# Patient Record
Sex: MALE | Race: WHITE
[De-identification: names, ages, dates, MRNs, and addresses within clinical notes are randomized per-mention and may not be internally consistent; named-entity substitution may affect disease eponyms.]

---

## 2017-01-18 ENCOUNTER — HOSPITAL ENCOUNTER (OUTPATIENT)
Dept: HOSPITAL 80 - FIMAGING | Age: 72
End: 2017-01-18
Attending: INTERNAL MEDICINE
Payer: COMMERCIAL

## 2017-01-18 DIAGNOSIS — I48.91: ICD-10-CM

## 2017-01-18 DIAGNOSIS — J40: Primary | ICD-10-CM

## 2017-01-18 DIAGNOSIS — R05: ICD-10-CM

## 2017-01-18 NOTE — DX
Chest, Two Views at 1731 hours



History: Atrial fibrillation, I 48.91. Cough.



Comparison: CT March 2014



Findings: Cardiac silhouette is within normal range. Lower thoracic levoscoliosis. Bilateral peribron
chial thickening. No pneumonia, congestive heart failure, pleural effusion, or pneumothorax.



Impression:  

1. Bronchitis.

2. No definite pneumonia.

3. No pneumothorax.

4. Consider CT chest imaging if clinically indicated.

## 2017-03-02 ENCOUNTER — HOSPITAL ENCOUNTER (OUTPATIENT)
Dept: HOSPITAL 80 - BHFA | Age: 72
End: 2017-03-02
Attending: INTERNAL MEDICINE
Payer: COMMERCIAL

## 2017-03-02 DIAGNOSIS — I48.0: Primary | ICD-10-CM

## 2017-03-02 DIAGNOSIS — I48.92: ICD-10-CM

## 2017-03-02 DIAGNOSIS — I47.1: ICD-10-CM

## 2017-04-03 ENCOUNTER — HOSPITAL ENCOUNTER (OUTPATIENT)
Dept: HOSPITAL 80 - FIMAGING | Age: 72
End: 2017-04-03
Attending: INTERNAL MEDICINE
Payer: COMMERCIAL

## 2017-04-03 DIAGNOSIS — I48.91: Primary | ICD-10-CM

## 2017-04-03 DIAGNOSIS — R91.8: ICD-10-CM

## 2017-04-03 LAB
CREAT SERPL-MCNC: 1.1 MG/DL (ref 0.7–1.3)
GFR SERPL CREATININE-BSD FRML MDRD: > 60 ML/MIN/{1.73_M2}

## 2017-04-03 PROCEDURE — 75572 CT HRT W/3D IMAGE: CPT

## 2017-04-04 ENCOUNTER — HOSPITAL ENCOUNTER (OUTPATIENT)
Dept: HOSPITAL 80 - FCATH | Age: 72
Discharge: HOME | End: 2017-04-04
Attending: INTERNAL MEDICINE
Payer: COMMERCIAL

## 2017-04-04 DIAGNOSIS — I48.92: Primary | ICD-10-CM

## 2017-04-04 DIAGNOSIS — I47.1: ICD-10-CM

## 2017-04-04 LAB
% IMMATURE GRANULYOCYTES: 0.6 % (ref 0–1.1)
ABSOLUTE IMMATURE GRANULOCYTES: 0.03 10^3/UL (ref 0–0.1)
ABSOLUTE NRBC COUNT: 0 10^3/UL (ref 0–0.01)
ADD DIFF?: NO
ADD MORPH?: NO
ADD SCAN?: NO
ANION GAP SERPL CALC-SCNC: 6 MEQ/L (ref 8–16)
APTT BLD: 25.7 SEC (ref 23–38)
ATYPICAL LYMPHOCYTE FLAG: 20 (ref 0–99)
CALCIUM SERPL-MCNC: 9 MG/DL (ref 8.5–10.4)
CHLORIDE SERPL-SCNC: 107 MEQ/L (ref 97–110)
CO2 SERPL-SCNC: 27 MEQ/L (ref 22–31)
CREAT SERPL-MCNC: 1 MG/DL (ref 0.7–1.3)
ERYTHROCYTE [DISTWIDTH] IN BLOOD BY AUTOMATED COUNT: 12.6 % (ref 11.5–15.2)
FRAGMENT RBC FLAG: 0 (ref 0–99)
GFR SERPL CREATININE-BSD FRML MDRD: > 60 ML/MIN/{1.73_M2}
GLUCOSE SERPL-MCNC: 102 MG/DL (ref 70–100)
HCT VFR BLD CALC: 44.8 % (ref 40–51)
HGB BLD-MCNC: 15.8 G/DL (ref 13.7–17.5)
INR PPP: 0.95 (ref 0.83–1.16)
LEFT SHIFT FLG: 0 (ref 0–99)
LIPEMIA HEMOLYSIS FLAG: 90 (ref 0–99)
MAGNESIUM SERPL-MCNC: 2.2 MG/DL (ref 1.6–2.3)
MCH RBC BLDCO QN: 30.6 PG (ref 27.9–34.1)
MCHC RBC AUTO-ENTMCNC: 35.3 G/DL (ref 32.4–36.7)
MCV RBC AUTO: 86.7 FL (ref 81.5–99.8)
NRBC-AUTO%: 0 % (ref 0–0.2)
PLATELET # BLD: 184 10^3/UL (ref 150–400)
PLATELET CLUMPS FLAG: 0 (ref 0–99)
PMV BLD AUTO: 9.1 FL (ref 8.7–11.7)
POTASSIUM SERPL-SCNC: 4.3 MEQ/L (ref 3.5–5.2)
PROTHROMBIN TIME: 12.6 SEC (ref 12–15)
RBC # BLD AUTO: 5.17 10^6/UL (ref 4.4–6.38)
SODIUM SERPL-SCNC: 140 MEQ/L (ref 134–144)

## 2017-04-04 PROCEDURE — C1731 CATH, EP, 20 OR MORE ELEC: HCPCS

## 2017-04-04 PROCEDURE — 93620 COMP EP EVL R AT VEN PAC&REC: CPT

## 2017-04-04 PROCEDURE — 93005 ELECTROCARDIOGRAM TRACING: CPT

## 2017-04-04 PROCEDURE — 93312 ECHO TRANSESOPHAGEAL: CPT

## 2017-04-04 PROCEDURE — 93623 PRGRMD STIMJ&PACG IV RX NFS: CPT

## 2017-04-04 NOTE — CPEKG
Heart Rate: 86

RR Interval: 698

P-R Interval: 252

QRSD Interval: 76

QT Interval: 380

QTC Interval: 455

P Axis: 82

QRS Axis: 59

T Wave Axis: 64

EKG Severity - ABNORMAL ECG -

EKG Impression: SINUS RHYTHM

EKG Impression: MULTIPLE ATRIAL PREMATURE COMPLEXES

EKG Impression: SINUS PAUSE/ARREST WITH ATRIAL ESCAPE

EKG Impression: FIRST DEGREE AV BLOCK

EKG Impression: CONSIDER ANTERIOR INFARCT

EKG Impression: ST ELEVATION IN PRIOR ECG (19-MAR-14) IS NOT PRESENT

Electronically Signed By: Jama Cuevas 04-Apr-2017 09:00:53

## 2017-04-04 NOTE — CPEKG
Heart Rate: 61

RR Interval: 984

P-R Interval: 220

QRSD Interval: 80

QT Interval: 400

QTC Interval: 403

P Axis: 42

QRS Axis: 55

T Wave Axis: 63

EKG Severity - ABNORMAL ECG -

EKG Impression: SINUS RHYTHM

EKG Impression: FIRST DEGREE AV BLOCK

EKG Impression: LVH BY VOLTAGE

EKG Impression: ST ELEVATION SUGGESTS PERICARDITIS

Electronically Signed By: Jama Cuevas 04-Apr-2017 12:52:58

## 2017-04-04 NOTE — EPPROC
Electrophysiology Procedure Note: 


DIAGNOSTIC ELECTROPHYSIOLOGIC STUDY 





Procedures performed:





1.   Fluoroscopy


2.   93621-26    EP evaluation with RA/RV/LA pace/record, with arrhythmia 

induction


3.   93620-26    EP evaluation with RA/RV pace record, insert/reposition 

catheter, with arrhythmia induction


4.   93623-26   Programmed stimulation + pacing after IV drug








INDICATION:





Prior CB ablation for AFIB at our institution


Atrial tachycardia, symptomatic


 





PROCEDURE:





Catheters & Anesthesia: 





The patient arrived in the Electrophysiology Laboratory in the fasting state.  

The right clavicular region, right groin, & left groin area were prepped & 

draped in the usual sterile manner.  Dr. Saavedra administered general 

anesthesia.  Appropriate non-invasive blood pressure, pulse oximetry & end-

tidal CO2 monitoring was established.


All catheters were placed percutaneously using the modified Seldinger technique

, and advanced into position under fluoroscopic guidance One #7 Greek 

deflectable octapolar electrode catheter was advanced to the His-bundle 

position via the left femoral vein (2mm spacing).  One #7 Greek Halo catheter 

was placed via the right femoral vein along the tricuspid annulus.    One #7 

Greek deflectable catheter with 10 pairs of electrodes was placed via the left 

femoral vein into the coronary sinus.     





Programmed stimulation was performed from the right atrium, right ventricle and 

coronary sinus (left atrium).   Parahisian pacing demonstrated VA block. 





Heparin was administered to keep ACT > 250 seconds.





Prior to arrival to the EP lab the patient had salvos of atrial tachycardia,  P 

waves narrower than during sinus rhythm, P waves positive in V1-V6, negative in 

inferior leads.  Post anesthesia induction, atrial tachycardia was no longer 

seen.   After 1 hour of programmed stimulation, the patient was recovered from 

anesthesia and procedure was continued for 1 more hour with the patient fully 

awake.





Upto 2 beats of atrial tachycardia were seen, early activation was along the 

interatrial septul with His-A earlier than proximal CS.  However not enough 

tachycardia was seen during the procedure for mapping and ablation.





Isoproterenol in graded doses up to 8 mcg/min was used. 





No ablation was performed. 





The catheters were removed.  The patient was transferred to the cardiovascular 

holding area in stable condition.  Vascular access sheaths were removed in the 

holding area.  There were no apparent complications.





Results:





A.   Spontaneous Intervals:


SCL 1040 ms  ms HV 45 ms





B.   Antegrade AV kesha function (decremental pacing)


 FPERP 520 ms   WBB  ms


 


C.   Retrograde AV kesha function (decremental pacing)


VA block








CONCLUSIONS





1.   Normal sinus and AV node function. 


2.   No evidence of accesory AV pathway presence.


3.   No sustained arrhythmias induced.  Nonsustained atrial tachycardia seen 

but not enough atrial tachycardia.


4.   No apparent complications.








Patient Problems: 


 Problems











Problem Status Onset


 


Atrial tachycardia Acute  


 


Atrial fibrillation or flutter Acute

## 2017-05-04 ENCOUNTER — HOSPITAL ENCOUNTER (OUTPATIENT)
Dept: HOSPITAL 80 - BHFA | Age: 72
End: 2017-05-04
Attending: INTERNAL MEDICINE
Payer: COMMERCIAL

## 2017-05-04 DIAGNOSIS — I47.1: Primary | ICD-10-CM

## 2017-05-23 ENCOUNTER — HOSPITAL ENCOUNTER (OUTPATIENT)
Dept: HOSPITAL 80 - FCATH | Age: 72
Setting detail: OBSERVATION
LOS: 1 days | Discharge: HOME | End: 2017-05-24
Attending: INTERNAL MEDICINE | Admitting: INTERNAL MEDICINE
Payer: COMMERCIAL

## 2017-05-23 DIAGNOSIS — I48.0: ICD-10-CM

## 2017-05-23 DIAGNOSIS — I48.92: ICD-10-CM

## 2017-05-23 DIAGNOSIS — I47.1: Primary | ICD-10-CM

## 2017-05-23 LAB
% IMMATURE GRANULYOCYTES: 0.4 % (ref 0–1.1)
ABSOLUTE IMMATURE GRANULOCYTES: 0.02 10^3/UL (ref 0–0.1)
ABSOLUTE NRBC COUNT: 0 10^3/UL (ref 0–0.01)
ADD DIFF?: NO
ADD MORPH?: NO
ADD SCAN?: NO
ANION GAP SERPL CALC-SCNC: 10 MEQ/L (ref 8–16)
ANION GAP SERPL CALC-SCNC: 9 MEQ/L (ref 8–16)
APTT BLD: 25.9 SEC (ref 23–38)
ATYPICAL LYMPHOCYTE FLAG: 0 (ref 0–99)
CALCIUM SERPL-MCNC: 8.4 MG/DL (ref 8.5–10.4)
CALCIUM SERPL-MCNC: 9.1 MG/DL (ref 8.5–10.4)
CHLORIDE SERPL-SCNC: 106 MEQ/L (ref 97–110)
CHLORIDE SERPL-SCNC: 106 MEQ/L (ref 97–110)
CO2 SERPL-SCNC: 24 MEQ/L (ref 22–31)
CO2 SERPL-SCNC: 24 MEQ/L (ref 22–31)
CREAT SERPL-MCNC: 1 MG/DL (ref 0.7–1.3)
CREAT SERPL-MCNC: 1.1 MG/DL (ref 0.7–1.3)
ERYTHROCYTE [DISTWIDTH] IN BLOOD BY AUTOMATED COUNT: 12.5 % (ref 11.5–15.2)
FRAGMENT RBC FLAG: 0 (ref 0–99)
GFR SERPL CREATININE-BSD FRML MDRD: > 60 ML/MIN/{1.73_M2}
GFR SERPL CREATININE-BSD FRML MDRD: > 60 ML/MIN/{1.73_M2}
GLUCOSE SERPL-MCNC: 103 MG/DL (ref 70–100)
GLUCOSE SERPL-MCNC: 89 MG/DL (ref 70–100)
HCT VFR BLD CALC: 43.9 % (ref 40–51)
HGB BLD-MCNC: 15.3 G/DL (ref 13.7–17.5)
INR PPP: 1.03 (ref 0.83–1.16)
LEFT SHIFT FLG: 0 (ref 0–99)
LIPEMIA HEMOLYSIS FLAG: 90 (ref 0–99)
MAGNESIUM SERPL-MCNC: 2.1 MG/DL (ref 1.6–2.3)
MAGNESIUM SERPL-MCNC: 2.2 MG/DL (ref 1.6–2.3)
MCH RBC BLDCO QN: 30.7 PG (ref 27.9–34.1)
MCHC RBC AUTO-ENTMCNC: 34.9 G/DL (ref 32.4–36.7)
MCV RBC AUTO: 88.2 FL (ref 81.5–99.8)
NRBC-AUTO%: 0 % (ref 0–0.2)
PLATELET # BLD: 200 10^3/UL (ref 150–400)
PLATELET CLUMPS FLAG: 10 (ref 0–99)
PMV BLD AUTO: 9.1 FL (ref 8.7–11.7)
POTASSIUM SERPL-SCNC: 3.9 MEQ/L (ref 3.5–5.2)
POTASSIUM SERPL-SCNC: 4.1 MEQ/L (ref 3.5–5.2)
PROTHROMBIN TIME: 13.4 SEC (ref 12–15)
RBC # BLD AUTO: 4.98 10^6/UL (ref 4.4–6.38)
SODIUM SERPL-SCNC: 139 MEQ/L (ref 134–144)
SODIUM SERPL-SCNC: 140 MEQ/L (ref 134–144)

## 2017-05-23 PROCEDURE — 93005 ELECTROCARDIOGRAM TRACING: CPT

## 2017-05-23 PROCEDURE — B245ZZZ ULTRASONOGRAPHY OF LEFT HEART: ICD-10-PCS | Performed by: INTERNAL MEDICINE

## 2017-05-23 PROCEDURE — 02K83ZZ MAP CONDUCTION MECHANISM, PERCUTANEOUS APPROACH: ICD-10-PCS | Performed by: INTERNAL MEDICINE

## 2017-05-23 PROCEDURE — 025T3ZZ DESTRUCTION OF LEFT PULMONARY VEIN, PERCUTANEOUS APPROACH: ICD-10-PCS | Performed by: INTERNAL MEDICINE

## 2017-05-23 PROCEDURE — B246ZZZ ULTRASONOGRAPHY OF RIGHT AND LEFT HEART: ICD-10-PCS | Performed by: INTERNAL MEDICINE

## 2017-05-23 PROCEDURE — C1731 CATH, EP, 20 OR MORE ELEC: HCPCS

## 2017-05-23 PROCEDURE — C1730 CATH, EP, 19 OR FEW ELECT: HCPCS

## 2017-05-23 PROCEDURE — 93623 PRGRMD STIMJ&PACG IV RX NFS: CPT

## 2017-05-23 PROCEDURE — C1759 CATH, INTRA ECHOCARDIOGRAPHY: HCPCS

## 2017-05-23 PROCEDURE — 93613 INTRACARDIAC EPHYS 3D MAPG: CPT

## 2017-05-23 PROCEDURE — 93662 INTRACARDIAC ECG (ICE): CPT

## 2017-05-23 PROCEDURE — 93306 TTE W/DOPPLER COMPLETE: CPT

## 2017-05-23 PROCEDURE — C1732 CATH, EP, DIAG/ABL, 3D/VECT: HCPCS

## 2017-05-23 PROCEDURE — 93657 TX L/R ATRIAL FIB ADDL: CPT

## 2017-05-23 PROCEDURE — 93621 COMP EP EVL L PAC&REC C SINS: CPT

## 2017-05-23 PROCEDURE — 4A023FZ MEASUREMENT OF CARDIAC RHYTHM, PERCUTANEOUS APPROACH: ICD-10-PCS | Performed by: INTERNAL MEDICINE

## 2017-05-23 PROCEDURE — C1893 INTRO/SHEATH, FIXED,NON-PEEL: HCPCS

## 2017-05-23 PROCEDURE — 5A1213Z PERFORMANCE OF CARDIAC PACING, INTERMITTENT: ICD-10-PCS | Performed by: INTERNAL MEDICINE

## 2017-05-23 RX ADMIN — ENOXAPARIN SODIUM SCH MG: 100 INJECTION SUBCUTANEOUS at 22:00

## 2017-05-23 NOTE — CPEKG
Heart Rate: 58

RR Interval: 1034

P-R Interval: 240

QRSD Interval: 80

QT Interval: 444

QTC Interval: 437

P Axis: 74

QRS Axis: 46

T Wave Axis: 65

EKG Severity - ABNORMAL ECG -

EKG Impression: SINUS RHYTHM

EKG Impression: FIRST DEGREE AV BLOCK

EKG Impression: ST ELEVATION SUGGESTS PERICARDITIS OR EARLY REPOL PATTERN

Electronically Signed By: Angela Mullins 23-May-2017 14:35:44

## 2017-05-23 NOTE — CPEKG
Heart Rate: 102

RR Interval: 588

QRSD Interval: 76

QT Interval: 376

QTC Interval: 490

QRS Axis: 40

T Wave Axis: 66

EKG Severity - ABNORMAL ECG -

EKG Impression: ONE SINUS BEAT

EKG Impression: A-FLUTTER W/ PREDOM 2:1 AV BLOCK, A-RATE 205

EKG Impression: BORDERLINE PROLONGED QT INTERVAL

EKG Impression: COMPARED [facility] 23 MAY 2017 AT 13:06, AFLUTTER NOW PRESENT.  QT LONGER

Electronically Signed By: Angela Mullins 23-May-2017 17:17:24

## 2017-05-23 NOTE — EPPROC
Electrophysiology Procedure Note: 


ELECTROPHYSIOLOGIC STUDY AND CATHETER MEDIATED ABLATION FOR  LEFT ATRIAL 

TACHYCARDIA (PRIOR AFIB CRYOBALLOON ABLATION)








Procedures performed:





92750-95    EP evaluation with RA/RV/LA pace/record, with arrhythmia induction





77777-43    EP evaluation with RA/RV pace record, insert/reposition catheter, 

with arrhythmia induction





96668    Atrial fibrillation ablation





47725    3D mapping 





Intracardiac echocardiogram





Transseptal puncture





Fluoroscopy








INDICATION:





Recurrent left atrial tachycardia





PROCEDURE:





The patient arrived in the Electrophysiology Laboratory in the fasting state.  

The right groin, left groin and right infraclavicular area were prepped and 

draped in the usual sterile fashion.  Procedure was done with patient fully 

awake as tachycardia could not be induced during prior procedure post sedation.

  





All catheters were placed percutaneously using the Seldinger technique and 

advanced into position under fluoroscopic guidance.  One #7 Sammarinese deflectable 

octapolar electrode catheter was placed in the His-bundle position via the left 

femoral vein .  A 20 pole catheter was placed in the coronary sinus.  One #8 

Sammarinese AcuNaV ultrasound catheter was placed in the left femoral vein and 

advanced into the right atrium. One #4 Sammarinese sheath was inserted into the left 

femoral artery via percutaneous technique and used for continuous arterial 

blood pressure monitoring and intermittent ACT determination.   





Programmed stimulation was performed from the right atrium, left atrium (CS) 

and right ventricle.   There was no evidence of AV accessory pathway.  There 

was dual AV kesha physiology but AVNRT was not inducible.





During infusion of isoproterenol 2 mcg/min an atrial tachycardia, -400 ms 

was inducible.  There was variable cycle length but CS activation pattern was 

constant.  Entrainment mapping ruled out right atrial tachycardia. 





Intracardiac echo evaluation of the left atrium and pulmonary veins was 

performed.  





Baseline ACT was drawn and heparin bolus was administered and heparin drip was 

started prior to transseptal puncture. ACT was checked every 15 minutes and 

maintained in the range of 350-400 seconds.  





One SL1 sheaths (8.5 Fr  ) was inserted into the right femoral vein and 

advanced into the right atrium.  Transseptal puncture was performed under 

intracardiac ultrasound, fluoroscopic and hemodynamic guidance placing the two 

sheaths into the left atrium. mention RF needle (C1 curve) was used.  The mean 

left atrial pressure was 12 mmHg.     





A high resolution map of the left atrium and all 4 PV was done using Pentaray 

catheter.  This showed RSPV, RIPV and LIPV to be isolated.   LSPV was not 

isolated with normal voltage seen anteriorly.   Activation map and electrograms 

confirmed that there was ongoing atrial fibrillation in the LSPV which 

conducted variably to the left atrium through this gap.  Ablation anterior to 

LSPV and at the ada terminated atrial tachycardia as PV isolation was 

achieved. 





One #8 Sammarinese quadrapolar electrode catheter (1mm-5mm-2mm spacing) with saline 

irrigated 3.5mm tip electrode (Cordis Dang Thermo-Cool catheter) and 

location sensor for the Gecko 3D mapping system was inserted through   

the transseptal sheath  and positioned outside the orifice of the left superior 

pulmonary vein.  A 15-25 mm variable Lasso catheter was placed inside the LSPV 

antrum.





Post ablation, with isoproterenol 4 mcg/min infusion, no atrial tachycardia or 

atrial fibrillation could be induced.   .





ICE imaging was consistent with pre ablation imaging;  moreover it showed no 

pericardial effusion or LA/PAULETTE thrombus at the end of the procedure. 





The catheters were withdrawn. SL1 sheath  was changed to 9 Fr short sheath.   

Protamine was given.  The sheaths were removed and manual pressure was used for 

hemostasis.    .  There were no complications.           





CONCLUSIONS:





1.   Left atrial tachycardia related to recurrent conduction along the anterior 

aspect of the left superior pulmonic vein..  


2.   Successful pulmonary vein isolation procedure (LSPV) with termination of 

atrial fibrillation (in the vein) and left atrial tachycardia. 


3.   No apparent complications. 








Patient Problems: 


 Problems











Problem Status Onset


 


Atrial fibrillation or flutter Acute  


 


Atrial tachycardia Acute

## 2017-05-23 NOTE — CPEKG
Heart Rate: 56

RR Interval: 1071

P-R Interval: 216

QRSD Interval: 76

QT Interval: 448

QTC Interval: 433

P Axis: 55

QRS Axis: 67

T Wave Axis: 72

EKG Severity - NORMAL ECG -

EKG Impression: SINUS RHYTHM

EKG Impression: FIRST-DEGREE AV BLOCK

EKG Impression: COMPARED WITH 4/4/2017 AT 12:23 P.M., EARLY REPOLARIZATION PATTERN IS LESS

EKG Impression: PROMINENT

Electronically Signed By: Angela Mullins 23-May-2017 09:57:50

## 2017-05-24 VITALS — RESPIRATION RATE: 20 BRPM | DIASTOLIC BLOOD PRESSURE: 64 MMHG | SYSTOLIC BLOOD PRESSURE: 107 MMHG

## 2017-05-24 VITALS — OXYGEN SATURATION: 98 % | HEART RATE: 56 BPM

## 2017-05-24 VITALS — TEMPERATURE: 97.5 F

## 2017-05-24 LAB
% IMMATURE GRANULYOCYTES: 0.3 % (ref 0–1.1)
ABSOLUTE IMMATURE GRANULOCYTES: 0.02 10^3/UL (ref 0–0.1)
ABSOLUTE NRBC COUNT: 0 10^3/UL (ref 0–0.01)
ADD DIFF?: NO
ADD MORPH?: NO
ADD SCAN?: NO
ANION GAP SERPL CALC-SCNC: 8 MEQ/L (ref 8–16)
ATYPICAL LYMPHOCYTE FLAG: 0 (ref 0–99)
CALCIUM SERPL-MCNC: 8.7 MG/DL (ref 8.5–10.4)
CHLORIDE SERPL-SCNC: 110 MEQ/L (ref 97–110)
CO2 SERPL-SCNC: 21 MEQ/L (ref 22–31)
CREAT SERPL-MCNC: 1 MG/DL (ref 0.7–1.3)
CREATINE KINASE-MB FRACTION: 2.33 NG/ML (ref 0–3.19)
ERYTHROCYTE [DISTWIDTH] IN BLOOD BY AUTOMATED COUNT: 12.7 % (ref 11.5–15.2)
FRAGMENT RBC FLAG: 0 (ref 0–99)
GFR SERPL CREATININE-BSD FRML MDRD: > 60 ML/MIN/{1.73_M2}
GLUCOSE SERPL-MCNC: 85 MG/DL (ref 70–100)
HCT VFR BLD CALC: 42 % (ref 40–51)
HGB BLD-MCNC: 14.4 G/DL (ref 13.7–17.5)
INR PPP: 1.14 (ref 0.83–1.16)
LEFT SHIFT FLG: 0 (ref 0–99)
LIPEMIA HEMOLYSIS FLAG: 90 (ref 0–99)
MCH RBC BLDCO QN: 30.9 PG (ref 27.9–34.1)
MCHC RBC AUTO-ENTMCNC: 34.3 G/DL (ref 32.4–36.7)
MCV RBC AUTO: 90.1 FL (ref 81.5–99.8)
NRBC-AUTO%: 0 % (ref 0–0.2)
PLATELET # BLD: 164 10^3/UL (ref 150–400)
PLATELET CLUMPS FLAG: 30 (ref 0–99)
PMV BLD AUTO: 9.2 FL (ref 8.7–11.7)
POTASSIUM SERPL-SCNC: 4.2 MEQ/L (ref 3.5–5.2)
PROTHROMBIN TIME: 14.5 SEC (ref 12–15)
RBC # BLD AUTO: 4.66 10^6/UL (ref 4.4–6.38)
SODIUM SERPL-SCNC: 139 MEQ/L (ref 134–144)
TROPONIN I SERPL-MCNC: 0.31 NG/ML (ref 0–0.03)

## 2017-05-24 RX ADMIN — ENOXAPARIN SODIUM SCH MG: 100 INJECTION SUBCUTANEOUS at 06:59

## 2017-05-24 NOTE — ECHO
[id number]

[id number]



+---------------------------------------------------+      [address]

:                                                   :       [place] [address]

:                                                   :           [phone]

+---------------------------------------------------+       Fax [phone]



                       Adult Echocardiographic Report



+----------------------------------------------------------------------------

-------+

:Name: CINDY BROWNesvinomayra Date: 2017 08:19 AM                      

       :

:MRN: [id number]        Hospital Admission Number: [id number] Locati

on: 253:

:: 1945        Gender: Male                           Height: 75 in 

       :

:Age: 71 yrs            Race: WH,White                         Weight: 180 lb

       :

:Reason For Study: Eval LV Fx                                                

       :

:                                                              BSA: 2.1 meter

s2     :

:History: Post Ablation                                                      

       :

+----------------------------------------------------------------------------

-------+

MMode/2D Measurements \T\ Calculations

IVSd: 1.0 cm   LVIDd: 5.1 cm  FS: 38.0 %             Ao root diam: 3.6 cm

LVPWd: 1.2 cm  LVIDs: 3.1 cm  EDV(Teich): 121.6 ml   ACS: 2.0 cm

                              ESV(Teich): 39.0 ml

                              EF(Teich): 67.9 %



Normal Measurement Values:

+----------------------------------------------------------------------------

----------------------------------+

:LVIDd (3.5-5.7cm)    IVSd (0.6-1.1cm)     LVPWd (0.6-1.1cm)     Aortic Root 

(2.0-3.7cm)Left Atrium (1.5-4.0cm):

:LV Vol(d) (76-115ml) LV Vol(s) (29-48ml)  Ejec Fraction (50-65%)PV David (0.6-

1.2m/s)    TV David (0.4-1.0m/s)    :

:MV E David (0.8-1.0m/s)MV A David (0.3-1.0m/s)LVOT David (0.7-1.2m/s) Asc Ao David (

0.9-1.8m/s)                       :

+----------------------------------------------------------------------------

----------------------------------+

Doppler Measurements \T\ Calculations

MV E max david:       Ao V2 max:         LV V1 max:         PA V2 max:

69.6 cm/sec         139.8 cm/sec       122.8 cm/sec       82.8 cm/sec

MV A max david:       Ao max P.8 mmHgLV V1 max PG:      PA max P.4 cm/sec                            6.0 mmHg           2.7 mmHg

MV E/A: 1.6

        _____________________________________________________________



PI end-d david:

99.1 cm/sec



Left Ventricle

The left ventricle is normal in size. There is borderline concentric left

ventricular hypertrophy. The left ventricular ejection fraction is normal.

Ejection Fraction = 68%. The left ventricular wall motion is normal.





Right Ventricle

The right ventricle is normal in size and function.



Atria

The left atrial size is normal. Right atrial size is normal.





Mitral Valve

The mitral valve is normal in structure and function. There is no evidence

of mitral valve prolapse. There is no mitral valve stenosis. There is trace

mitral regurgitation.



Tricuspid Valve

There is trace tricuspid regurgitation.



Aortic Valve

The aortic valve is normal in structure and function. The aortic valve is

trileaflet. The aortic valve opens well. There is no aortic stenosis. There

is no aortic insufficiency.



Pulmonic Valve

The pulmonic valve is normal in structure and function. There is no pulmonic

valvular regurgitation.



Great Vessels

The aortic root is normal size.





Conclusion

A complete two-dimensional transthoracic echocardiogram was performed (2D,

M-mode, Doppler and color flow Doppler).

There is borderline concentric left ventricular hypertrophy.

The left ventricular ejection fraction is normal.

Ejection Fraction = 68%.

The left ventricular wall motion is normal.

The right ventricle is normal in size and function.

The left atrial size is normal.

Right atrial size is normal.

The mitral valve is normal in structure and function.

There is trace mitral regurgitation.

There is trace tricuspid regurgitation.

The aortic valve is normal in structure and function.

The aortic valve is trileaflet.

The pulmonic valve is normal in structure and function.



_____________________________________________________________________________







Final Reading Physician:

                        Osmar Britton MD  electronically signed on 2017

                        09:24 AM

Ordering Physician: Osmar Britton

Performed By: Javi Henson, RDCS

## 2017-05-24 NOTE — GDS
[f rep st]



                                                             DISCHARGE SUMMARY





DISCHARGE DIAGNOSES:  

1.  Atrial tachycardia, status post left superior pulmonic vein isolation with cryo-balloon ablation
.

2.  Previous atrial fibrillation ablation 3 years ago.

3.  Recent attempt at atrial tachycardia ablation approximately 1 month ago, unsuccessful due to use
 of periprocedural anesthesia.



PROCEDURES:  

1.  05/23/2017:  Electrophysiology procedure with left atrial tachycardia related to recurrent condu
ction along the anterior aspect of the left superior pulmonic vein, status post successful pulmonary
 vein isolation with termination of atrial fibrillation in the vein and left atrial tachycardia.

2.  05/24/2017:  Echocardiogram with EF of 68%, normal LV wall motion, normal RV size and function, 
normal left atrial and right atrial sizes, trace MR, trace TR.



BRIEF HISTORY:  Please see dictated H and P by Dr. Britton for complete details.  In brief, the patient 
is a 71-year-old male with a previous history of A-fib ablation 3 years ago.  He has been noted to h
ave atrial tachycardias.  EP study last month was done, but no atrial tachycardia could be induced o
n the EP study, despite atrial tachycardia occurring prior to his procedure.  His procedure was repe
ated without any sedation whatsoever.  He was able to perform the atrial tachycardia ablation.  On d
ay of discharge, patient denies any groin pain, chest pain, or dyspnea.  Telemetry appears stable.



PHYSICAL EXAMINATION:  VITAL SIGNS:  On day of discharge, blood pressure 107/64, heart rate ________
__ respirations 20, O2 saturation 99% on room air.  GENERAL:  He is a very pleasant male in no appar
ent distress.  EYES:  LANDY.  HEART:  Regular rate and rhythm.  LUNGS:  Clear.  BILATERAL GROIN SITES
:  Without ecchymosis, bruits, or tenderness.



LABORATORY DATA:  CBC with WBC 6.52, hemoglobin 14.4, hematocrit 42, platelet count of 164.  BMP wit
h sodium 139, potassium 4.2, chloride 110, CO2 21, BUN 17, creatinine 1.  Troponin 0.0314, consisten
t with recent ablation. 



Results pending:  None.



DIET:  Per previous.



ACTIVITY:  Groin precautions reviewed.



DISCHARGE MEDICATIONS:  Please see med reconciliation for complete details.  He is being discharged 
on his Flonase, ascorbic acid, and apixaban.  He is advised to start omeprazole 20 mg p.o. daily for
 the next 4-6 weeks.



FOLLOWUP:  With Dr. Britton in 1 month.





Job #:  [id number]

## 2017-05-24 NOTE — CPEKG
Heart Rate: 55

RR Interval: 1091

P-R Interval: 224

QRSD Interval: 78

QT Interval: 428

QTC Interval: 410

P Axis: 50

QRS Axis: 50

T Wave Axis: 67

EKG Severity - ABNORMAL ECG -

EKG Impression: SINUS RHYTHM

EKG Impression: FIRST DEGREE AV BLOCK

EKG Impression: BORDERLINE T ABNORMALITIES, ANT-LAT LEADS

EKG Impression: COMPARED WITH 5/23/2017 AT 14:55, NSR NOW PRESENT. QT INTERVAL SHORTER

Electronically Signed By: Angela Mullins 24-May-2017 10:48:59

## 2017-06-22 ENCOUNTER — HOSPITAL ENCOUNTER (OUTPATIENT)
Dept: HOSPITAL 80 - BHFA | Age: 72
End: 2017-06-22
Attending: INTERNAL MEDICINE
Payer: COMMERCIAL

## 2017-06-22 DIAGNOSIS — I47.1: Primary | ICD-10-CM

## 2017-09-13 ENCOUNTER — HOSPITAL ENCOUNTER (INPATIENT)
Dept: HOSPITAL 80 - F3N | Age: 72
LOS: 1 days | Discharge: HOME HEALTH | DRG: 470 | End: 2017-09-14
Attending: ORTHOPAEDIC SURGERY | Admitting: ORTHOPAEDIC SURGERY
Payer: COMMERCIAL

## 2017-09-13 DIAGNOSIS — M17.11: Primary | ICD-10-CM

## 2017-09-13 PROCEDURE — 0SRC0J9 REPLACEMENT OF RIGHT KNEE JOINT WITH SYNTHETIC SUBSTITUTE, CEMENTED, OPEN APPROACH: ICD-10-PCS | Performed by: ORTHOPAEDIC SURGERY

## 2017-09-13 PROCEDURE — C1713 ANCHOR/SCREW BN/BN,TIS/BN: HCPCS

## 2017-09-13 RX ADMIN — FAMOTIDINE SCH MG: 20 TABLET, FILM COATED ORAL at 21:15

## 2017-09-13 RX ADMIN — Medication SCH MLS: at 21:15

## 2017-09-13 RX ADMIN — OXYCODONE HYDROCHLORIDE PRN MG: 15 TABLET ORAL at 17:01

## 2017-09-13 RX ADMIN — SODIUM CHLORIDE SCH MLS: 900 INJECTION, SOLUTION INTRAVENOUS at 16:17

## 2017-09-13 RX ADMIN — DOCUSATE SODIUM AND SENNOSIDES SCH TAB: 50; 8.6 TABLET ORAL at 22:18

## 2017-09-13 RX ADMIN — ACETAMINOPHEN SCH MG: 325 TABLET ORAL at 23:22

## 2017-09-13 RX ADMIN — ACETAMINOPHEN SCH MG: 325 TABLET ORAL at 18:40

## 2017-09-13 NOTE — POSTOPPROG
Post Op Note


Date of Operation: 09/13/17


Surgeon: Evonne Alofnso


Assistant: Evonne Alfonso PA-C


Anesthesiologist: Graeme


Anesthesia: Spinal


Pre-op Diagnosis: R knee osteoarthritis


Post-op Diagnosis: R knee osteoarthritis


Procedure: R TKA


Findings: See full dictation


Inf/Abcess present in the surg proc area at time of surgery?: No


Depth: Organ Space


EBL: Minimal

## 2017-09-13 NOTE — GOP
[f rep st]



                                                                OPERATIVE REPORT





DATE OF OPERATION:  09/13/2017



SURGEON:  Jarrell Bear MD



ASSISTANT:  Evonne Alfonso PA-C.



ANESTHESIA:  General.



PREOPERATIVE DIAGNOSIS:  Right knee osteoarthritis with moderate genu varum.



POSTOPERATIVE DIAGNOSIS:  Right knee osteoarthritis with moderate genu varum.



PROCEDURE PERFORMED:  Right total knee arthroplasty with ConforMIS custom knee (cruciate retaining).



FINDINGS:  







DESCRIPTION OF PROCEDURE:  The patient was taken to the operating room, administered general anesthes
ia, placed in the supine position.  The right lower extremity was prepped and draped in normal steril
e fashion.  Esmarch exam was performed followed by elevation of the thigh cuff to 275 mmHg pressure. 
 A midline incision was made through dermal subcutaneous tissues.  Medial parapatellar incision was m
jessica.  The patella was reflected laterally.  The anterior portion of the medial and lateral menisci we
re excised.  The fat pad was recessed.  The patella was sized.  The patella was cut at 9 mm thickness
.  The 3 drill lugs were drilled for a 35 mm patellar button.  



The distal femur was exposed.  The distal femoral drill guide was placed in the distal femur and a dr
ill was passed down to subchondral bone on medial and lateral femoral condyles.  The distal femoral c
utting guide was then placed in the distal femur and secured proximally with 3 pins and distally with
 2 pins.  The distal femoral cut was then made.  The distal 2 pin holes were used for markings for a 
rotational axis.  The tibia was subluxed anteriorly and retractors were put in position.  The posteri
or portion of the medial and lateral menisci were excised.  The tibial articular surface was denuded 
of its chondral tissue down to subchondral bone on both medial and lateral tibial plateaus using a ri
ng curette. 



The tibial alignment device was then placed in the tibia and exiting off the subchondral bone.  It wa
s secured proximally with 3 pins.  The tibial cut was then made with the oscillating saw.  The flexio
n and extension gaps were assessed for balance.  The knee was a little tight in flexion and extension
.  We elected to go with a 2 mm larger cut on the tibia.  The pins were reinserted and the cutting gu
joseph was replaced.  The cut was then made with the oscillating saw.  The distal femoral AP cutting blo
ck was then placed in the distal femur.  It rotated into appropriate axis and our flexion gap was aga
in assessed with that block in place.  We subsequently made our AP cuts followed by our anterior per
tracey cuts.  The posterior chamfer cutting block was then placed into the drill lugs and the distal fem
ur.  The 2 posterior chamfer cuts were then made with the oscillating saw. 



The trial femoral insert was put in place.  Osteophytes were removed from around the posterior aspect
 of the femur.  The trial tibial insert was put in place.  The knee was put through flexion, extensio
n to access motion and rotational assessment was made for the tibia.  This was then marked.  The tibi
al guide was then placed in the proximal tibia.  Our metaphyseal drill was passed down through the pr
oximal tibia.  The fin cutting block was then impacted in the proximal tibia.  All trials were bi
t out.  Thorough lavage was performed with normal saline.  All surfaces were thoroughly dried.  



The tibia was cemented into place, followed by the femur, followed by the patella.  All excess cement
 was removed.  The trial inserts were put in position.  A 6 mm medial A lateral was selected.  The re
al inserts were opened.  The real inserts were impacted into position.  The knee was put through flex
ion extension arc of motion and full motion was obtained without significant undue tension.  The knee
 was stable to varus valgus stressing.  Thorough lavage was performed with normal saline.  Our cockta
il solution was used deeply in and around the subcutaneous tissues.  The retinaculum was then closed 
with a #1 Vicryl suture followed by closure of the subcutaneous tissue with 2-0 Vicryl suture followe
d by closure of the dermis with staples.  A sterile compression dressing was applied.  



KAROLINA hose were applied.  The patient tolerated the procedure well, was transferred back to recovery in
 stable condition.  No operative complications.



COMPLICATIONS:  None.





Job #:  [id number]

## 2017-09-13 NOTE — POSTANESTH
Post Anesthetic Evaluation


Cardiovascular Status: Normal, Stable


Respiratory Status: Normal, Stable


Level of Consciousness/Mental Status: Can Participate in Eval


Pain Control: Adequate, Prn Tx Ordered


Nausea/Vomiting Control: Adequate, Prn Tx Ordered


Complications Possibly Related to Anesthesia: None Noted (Adductor cannal block 

done in recovery.  Well tolerated.)

## 2017-09-13 NOTE — PDANEPAE
ANE History of Present Illness





Knee OA





ANE Past Medical History





- Cardiovascular History


Hx Hypertension: No


Hx Arrhythmias: No


Hx Chest Pain: No


Hx Coronary Artery / Peripheral Vascular Disease: No


Hx CHF / Valvular Disease: No


Hx Palpitations: No


Cardiovascular History Comment: CARDIAC ABLATION X 2 FOR ATRIAL FIB





- Pulmonary History


Hx COPD: No


Hx Asthma/Reactive Airway Disease: No


Hx Recent Upper Respiratory Infection: No


Hx Oxygen in Use at Home: No


Hx Sleep Apnea: No


Sleep Apnea Screening Result - Last Documented: Negative





- Neurologic History


Hx Cerebrovascular Accident: No


Hx Seizures: No


Hx Dementia: No





- Endocrine History


Hx Diabetes: No





- Renal History


Hx Renal Disorders: No





- Liver History


Hx Hepatic Disorders: No





- Neurological & Psychiatric Hx


Hx Neurological and Psychiatric Disorders: No





- Cancer History


Hx Cancer: No


Cancer History Comment: SKIN CANCER BASAL





- Congenital Disorder History


Hx Congenital Disorders: No





- GI History


Hx Gastrointestinal Disorders: No





- Other Health History


Other Health History: NEG





- Chronic Pain History


Chronic Pain: Yes (KNEE PAIN)





- Surgical History


Prior Surgeries: APPENDECTOMY.  CARDIAC ABLATION X2 MAY 2017 & MARCH 2014.  

KNEE SCOPE





ANE Review of Systems


Review of Systems: 








- Exercise capacity


METS (RN): 5 METS





ANE Patient History





- Allergies


Allergies/Adverse Reactions: 








No Known Allergies Allergy (Unverified 01/19/10 12:24)


 








- Home Medications


Home medications: home medication list seen and reviewed


Home Medications: 








Ascorbic Acid [Vitamin C 500 mg (*)] 1 tab PO DAILY 03/18/14 [Last Taken 1 Week 

Ago ~09/06/17]


Herbals/Supplements -Info Only 1 ea PO DAILY 04/04/17 [Last Taken 1 Week Ago ~09 /06/17]


Omega-3 Fatty Acids [Fish Oil 1000 mg (*)] 1,000 mg PO DAILY 04/04/17 [Last 

Taken 1 Week Ago ~09/06/17]








- NPO status


NPO Since - Liquids (Date): 09/13/17


NPO Since - Liquids (Time): 07:00


NPO Since - Solids (Date): 09/12/17


NPO Since - Solids (Time): 22:00





- Smoking Hx


Smoking Status: Never smoked





- Family Anes Hx


Family Hx Anesthesia Complications: NEG





ANE Labs/Vital Signs





- Vital Signs


Blood Pressure: 138/95


Heart Rate: 50


Respiratory Rate: 12


O2 Sat (%): 95


Height: 190.5 cm


Weight: 79.379 kg





ANE Physical Exam





- Airway


Neck exam: FROM


Mallampati Score: Class 1


Mouth exam: normal dental/mouth exam





- Pulmonary


Pulmonary: no respiratory distress





- Cardiovascular


Cardiovascular: regular rate and rhythym





- ASA Status


ASA Status: II





ANE Anesthesia Plan


Anesthesia Plan: MAC, spinal


Regional Anesthesia: adductor canal FNB

## 2017-09-14 VITALS — HEART RATE: 56 BPM

## 2017-09-14 VITALS
OXYGEN SATURATION: 88 % | TEMPERATURE: 99.3 F | SYSTOLIC BLOOD PRESSURE: 110 MMHG | DIASTOLIC BLOOD PRESSURE: 56 MMHG | RESPIRATION RATE: 16 BRPM

## 2017-09-14 LAB
HCT VFR BLD CALC: 39.1 % (ref 40–51)
HGB BLD-MCNC: 13.5 G/DL (ref 13.7–17.5)

## 2017-09-14 RX ADMIN — OXYCODONE HYDROCHLORIDE PRN MG: 15 TABLET ORAL at 13:11

## 2017-09-14 RX ADMIN — DOCUSATE SODIUM AND SENNOSIDES SCH TAB: 50; 8.6 TABLET ORAL at 08:27

## 2017-09-14 RX ADMIN — ACETAMINOPHEN SCH MG: 325 TABLET ORAL at 11:26

## 2017-09-14 RX ADMIN — SODIUM CHLORIDE SCH MLS: 900 INJECTION, SOLUTION INTRAVENOUS at 05:38

## 2017-09-14 RX ADMIN — Medication SCH MLS: at 05:47

## 2017-09-14 RX ADMIN — FAMOTIDINE SCH MG: 20 TABLET, FILM COATED ORAL at 08:27

## 2017-09-14 RX ADMIN — ACETAMINOPHEN SCH MG: 325 TABLET ORAL at 05:38

## 2017-09-14 NOTE — SOAPPROG
SOAP Progress Note


Assessment/Plan: 


Assessment/Plan: s/p R TKA POD#1


- Continue pain management, encourage PO


- PT/OT


- Lovenox 40mg daily for VTE chemoprophylaxis


- SCDs/TEDs for mechanical prophylaxis


- Continue antibiotic prophylaxis until course is finished


- Discharge pending PT/OT approval and appropriate pain control with oral 

medications
































09/14/17 07:30





Subjective: 


Pt states he is doing well, pain is well managed.  Pt denies fever, chills, 

chest pain, SOB, abdominal pain, N/V/D, numbness, tingling and calf pain.





Objective: 





 Vital Signs











Temp Pulse Resp BP Pulse Ox


 


 37.3 C   56 L  18   99/54 L  91 L


 


 09/14/17 04:00  09/14/17 04:00  09/14/17 04:00  09/14/17 04:00  09/14/17 04:00








 Laboratory Results





 09/14/17 04:57 





 











 09/13/17 09/14/17 09/15/17





 05:59 05:59 05:59


 


Intake Total  4560 


 


Output Total  500 


 


Balance  4060 














Physical Exam





- Physical Exam


General Appearance: alert, no apparent distress


Cardiac/Chest: normal peripheral pulses


Skin: normal color, warm/dry, other (post-operative dressing clean and intact)


Extremities: normal inspection, normal capillary refill, swelling (localized 

edema R knee), Viridiana's sign, other (Able to perform a SLR on the R side), No 

pedal edema, No calf tenderness


Neuro/Psych: no motor/sensory deficits, alert, normal mood/affect, oriented x 3





ICD10 Worksheet


Patient Problems: 


 Problems











Problem Status Onset


 


Atrial fibrillation or flutter Acute  


 


Atrial tachycardia Acute

## 2017-09-14 NOTE — PDIAF
- Diagnosis


Diagnosis: Right knee osteoarthritis


Code Status: Full Code





- Medication Management


Discharge Medications: 


 Medications to Continue on Transfer





Ascorbic Acid [Vitamin C 500 mg (*)] 1 tab PO DAILY 03/18/14 [Last Taken 1 Week 

Ago ~09/06/17]


Herbals/Supplements -Info Only 1 ea PO DAILY 04/04/17 [Last Taken 1 Week Ago ~09 /06/17]


Omega-3 Fatty Acids [Fish Oil 1000 mg (*)] 1,000 mg PO DAILY 04/04/17 [Last 

Taken 1 Week Ago ~09/06/17]


Acetaminophen [Tylenol 325mg (*)] 650 mg PO Q6HRS  tab 09/14/17 [Last Taken 

Unknown]


Enoxaparin [Lovenox 40 MG (*)] 40 mg SC DAILY #21 syr 09/14/17 [Last Taken 

Unknown]


oxyCODONE IR [Oxycodone Ir (*)] 5 - 10 mg PO Q3HRS PRN #50 tab 09/14/17 [Last 

Taken Unknown]








Long Term Antibiotics: None


Discharge Medications: Refer to the Discharge Home Medication list for PRN 

reason.





- Orders


Services needed: Physical Therapy (Pt will require home health physical therapy 

as he will need PT twice weekly and is not able to drive)


Diet Recommendation: no restrictions on diet


Diet Texture: Regular Texture Diet


Wound Care Instructions: Pt is to keep incision site clean and dry at all times 

until follow up


Activity/Weight Bearing Restrictions: Pt is to remain WBAT





- Follow Up Care


Current Providers and Referrals: 


Jarrell Bear MD [Medical Doctor] -  (Follow-up with Dr. Bear at your first 

post-operative appointment. Call sooner with any questions or concerns.)


Kervin Burnett MD [Primary Care Provider] -

## 2017-09-14 NOTE — ASDISCHSUM
----------------------------------------------

Discharge Information

----------------------------------------------

Plan Status:Home with Home Health                    Medically Cleared to Leave:

Discharge Date:09/14/2017 01:48 PM                   CM D/C Disposition:Home Health Service

ADT D/C Disposition:Home Health Service              Projected Discharge Date:09/14/2017 11:00 AM

Transportation at D/C:Family                         Discharge Delay Reason:

Follow-Up Date:09/14/2017 11:00 AM                   Discharge Slot:

Final Diagnosis:

----------------------------------------------

Placement Information

----------------------------------------------

Referral Type:*Home Health Care Services             Referral ID:[id number]

Provider Name:[facility]

Address 1:[address] [place] [address]                  Phone [phone]

Address 2:                                           Fax [phone]

City:[place]                                         Selection Factors:

State:[place]

 

----------------------------------------------

Patient Contact Information

----------------------------------------------

Contact Name:DIXON                            Relationship:Son

Address:                                             Home [phone]

                                                     Work Phone:

City:                                                [facility] Phone:

[facility] Code:                                      Email:

----------------------------------------------

Financial Information

----------------------------------------------

Financial Class:VALDEZ

Primary Plan Desc:MEDICARE INPATIENT                 Primary Plan Number:[id number]

Secondary Plan Desc:[employer] MEDICARE                   Secondary Plan Number:[id number]

 

 

----------------------------------------------

Assessment Information

----------------------------------------------

----------------------------------------------

Intervention Information

----------------------------------------------

## 2017-09-16 NOTE — PDDCSUM
Discharge Summary


Discharge Summary: 


71y/o M with R knee osteoarthritis admitted to undergo R TKA with Dr. Bear.  

Pt received one dose of antibiotics prior to surgery and an additional 24 hours 

of antibiotic prophylaxis post-operatively.  Pt was evaluated by PT and OT.  

Lovenox 40mg daily x 21 days for chemoprophylaxis and SCDs/TEDs for mechanical 

prophylaxis.  Pain was well managed.  Orders were written for home PT bi-

weekly.  Pt was instructed to follow-up with Dr. Bear 10-14 days post-

operatively.  Hospital course was otherwise uneventful.

## 2017-12-14 ENCOUNTER — HOSPITAL ENCOUNTER (OUTPATIENT)
Dept: HOSPITAL 80 - FIMAGING | Age: 72
End: 2017-12-14
Attending: OTOLARYNGOLOGY
Payer: COMMERCIAL

## 2017-12-14 DIAGNOSIS — R05: Primary | ICD-10-CM

## 2018-01-04 ENCOUNTER — HOSPITAL ENCOUNTER (OUTPATIENT)
Dept: HOSPITAL 80 - FIMAGING | Age: 73
End: 2018-01-04
Attending: INTERNAL MEDICINE
Payer: COMMERCIAL

## 2018-01-04 DIAGNOSIS — R93.8: Primary | ICD-10-CM

## 2018-01-26 ENCOUNTER — HOSPITAL ENCOUNTER (INPATIENT)
Dept: HOSPITAL 80 - FED | Age: 73
LOS: 2 days | Discharge: HOME | DRG: 176 | End: 2018-01-28
Attending: FAMILY MEDICINE | Admitting: FAMILY MEDICINE
Payer: COMMERCIAL

## 2018-01-26 DIAGNOSIS — Z23: ICD-10-CM

## 2018-01-26 DIAGNOSIS — I26.99: Primary | ICD-10-CM

## 2018-01-26 DIAGNOSIS — I82.492: ICD-10-CM

## 2018-01-26 DIAGNOSIS — I82.432: ICD-10-CM

## 2018-01-26 DIAGNOSIS — I48.91: ICD-10-CM

## 2018-01-26 DIAGNOSIS — Z96.651: ICD-10-CM

## 2018-01-26 LAB — PLATELET # BLD: 186 10^3/UL (ref 150–400)

## 2018-01-26 PROCEDURE — G0008 ADMIN INFLUENZA VIRUS VAC: HCPCS

## 2018-01-26 RX ADMIN — ENOXAPARIN SODIUM SCH: 100 INJECTION SUBCUTANEOUS at 20:12

## 2018-01-26 NOTE — ECHO
[email or web address]





[address] [address] 

Main: [phone] 



Fax: 



Transthoracic Echocardiogram 

Name:             CINDY BROWN                          MR#:

[id number]

Study Date:       2018                              Study Time:

05:07 PM

YOB: 1945                              Age:

72 year(s)

Height:           190.5 cm (75 in.)                       Weight:

79.38 kg (175 lb.)

BSA:              2.07 m2                                 Gender:

Male

Examination:      Echo                                    Indication:

dyspnea and BNP 8000

Image Quality:    Adequate                                Contrast: 

Requested by:     Alvin Wilkins                               BP:

124 mmHg/79 mmHg

Heart Rate:                                               Rhythm:

Normal sinus rhythm

Indication:       dyspnea and BNP 8000 



Procedure Staff 

Ultrasound Technician:   Raysa Nuñez [facility] 

Reading Physician:       Angela Mullins 

Requesting Provider:     Alvin Wilkins 



Conclusions:          The left ventricle cavity is small.  

Mild to moderate LVH.  

Normal global systolic LV function.  

EF is 67 %.  

Flattened and paradoxical interventricular septum consistent with RV

pressure overload.

Moderate to severely dilated right ventricle.  

Moderately to severely reduced right ventricular function.  

The right atrium is mildly dilated.  

Mild mitral valve regurgitation is present.  

Mild to moderate tricuspid valve regurgitation.  

Right ventricular systolic pressure measures 78mmHg.  

The pulmonary artery pressure is severely increased.  

Small pericardial effusion.  

Results discussed with Dr. Wilkins 

Compared with 2017 RV is now more dilated with reduced systolic

function. Severe PHTN now

present 



Measurements: 

Chambers                    Valvular Assessment AV/MV

Valvular Assessment TV/PV



Normal                                   Normal

Normal

Name         Value     Range              Name         Value Range

Name           Value Range

Ao Emily (MM): 3.4 cm    (2.2 cm-3.7            AV Vmax:     1.35 m/s (1

m/s-1.7       TR Vmax:       3.98 mm/s ( - )



cm)                                  m/s)             TR PGmax:

63 mmHg ( - )

IVSd (2D):   1.3 cm (0.6 cm-1.1               AV maxP mmHg ( -

)          syst. PAP: 78 mmHg  ( - )



cm)                LVOT Vmax:   1.26 m/s (0.7 m/s-1.1     PV Vmax:

0.77 m/s (0.6 m/s-0.9

LVDd (2D):   4.0 cm    (4.2 cm-5.9                              m/s)

m/s)



cm)                MV E Vmax:   0.38 m/s ( - )        PV PGmax:      2

mmHg ( - )

LVDs (2D):   2.6 cm    (2.1 cm-4              MV A Vmax:   0.57 m/s (

- )



cm)                MV E/A:      0.67 ( - )  



Patient: CINDY BROWN                        MRN: [id number]

Study Date: 2018   Page 1 of 2

05:07 PM 









LVPWd (2D): 1.0 cm (0.6 cm-1 

cm)   



LVEF (BP):   67 %      (>=55 %)   

RVDd(2D):    4.9 cm    (1.9 cm-3.8 



cmmm)  



Continued Measurements: 

Chambers                      Valvular Assessment AV/MV

Valvular Assessment TV/PV



Name                       Value          Name

Value    Name                      Value

LADs Lon.1 cm               MV DecTime:

236 m/s     CVP (est.):             15 mmHg

LA Area:                 19.5 cm2         MV E/E' Septal:        8.70



LA Volume:               52 ml            MV E/E' Lateral:       3.30



LA Volume Index:         25.1 ml/m2   

TAPSE:                   1.0 cm    

RA Area:                 17.3 cm2   



Additional Vessels  



Name                       Value  

Ao Ascending:            3.4 cm    



Findings:             Left Ventricle: 

The left ventricle cavity is small. Mild to moderate LVH. Normal

global systolic LV function. EF is 67

%. Diastolic LV function normal for age. Flattened and paradoxical

interventricular septum consistent

with RV pressure overload.  

Right Ventricle: 

Moderate to severely dilated right ventricle. Moderately to severely

reduced right ventricular function.

Left Atrium: 

The left atrium is normal in size.  

Right Atrium: 

The right atrium is mildly dilated.  

Mitral Valve: 

The mitral valve is normal in appearance and function. There is mild

thickening of the mitral valve

leaflets. Mild mitral valve regurgitation is present. No mitral

stenosis is present.

Aortic Valve: 

The aortic valve is tri-leaflet and functions normally. There is no

significant aortic valve regurgitation.

No aortic valve stenosis is present.  

Tricuspid Valve: 

Mild to moderate tricuspid valve regurgitation. Right ventricular

systolic pressure measures

78mmHg. The pulmonary artery pressure is severely increased.  

Pulmonic Valve: 

The pulmonic valve is normal in appearance and function. Mild pulmonic

valve regurgitation is noted.



Aorta: 

Normal size aortic root measuring 3.4 cm. Normal size ascending aorta

measuring 3.4 cm.

IVC: 

The IVC is dilated. There is less than 50% respiratory excursion.  

Pericardium: 

Small pericardial effusion. 







Electronically signed by Angela Mullins on 2018 at 06:28 PM 

(No Signature Object) 



Patient: CINDY BROWN                        MRN: [id number]

Study Date: 2018   Page 2 of 2

05:07 PM 







[id number]

## 2018-01-26 NOTE — PDGENHP
History and Physical





- Chief Complaint


RYAN





- History of Present Illness


Pleasant 71 yo male presented with progressive Dyspnea on exertion. His SOB 

dates back to mid December. In the E.D. a CT showed large volume bilateral PE 

with infarct and right sided strain. Troponin is indeterminate. He does not 

have LE swelling or pain. He does not have a hx of DVT or PE. He is not having 

chest pain. He coughs when trying to take a deep breath. He was recently 

prescribed Prednisone and Zpak about a week ago w/o any improvement. He has a 

hx of Afib with ablation in May of 2017 and he is a patient at [facility]. 

He is not on chronic AC.





He denies fever. He denies focal weakness. Denies jaw pain, numbness, or arm 

pain.





His son is at bedside








PAST MEDICAL HISTORY:  Total knee arthroplasty, appendectomy, atrial 

fibrillation with ablation in May of 2017





Social history:  Nonsmoker, no ETOH, cyclist





FmHx: negative for VTE








History Information





- Allergies/Home Medication List


Allergies/Adverse Reactions: 








No Known Allergies Allergy (Verified 01/26/18 16:36)


 





Home Medications: 








Ascorbic Acid [Vitamin C 500 mg (*)] 500 mg PO DAILY 01/26/18 [Last Taken 01/26/ 18]


Azithromycin [Zithromax] 250 mg PO DAILY 01/26/18 [Last Taken 01/26/18]


Benzonatate [Tessalon Pearles (RX)] 100 mg PO Q8H 01/26/18 [Last Taken 01/26/18]


Omega-3 Fatty Acids [Fish Oil 1000 mg (*)] 1,000 mg PO DAILY 01/26/18 [Last 

Taken 01/26/18]





I have personally reviewed and updated: medical history, social history





- Social History


Smoking Status: Never smoked





Review of Systems


Review of Systems: 





ROS: 10pt was reviewed & negative except for what was stated in HPI & below





Physical Exam


Physical Exam: 

















Temp Pulse Resp BP Pulse Ox


 


 36.4 C   80   18   128/82 H  98 


 


 01/26/18 14:29  01/26/18 18:19  01/26/18 18:19  01/26/18 18:19  01/26/18 18:19




















O2 (L/minute)                  2














Constitutional: no apparent distress


Eyes: PERRL


Ears, Nose, Mouth, Throat: moist mucous membranes, hearing normal


Cardiovascular: regular rate and rhythym, No JVD, No edema


Respiratory: no respiratory distress, reduced air movement, No no rales or 

rhonchi, No expiratory wheeze


Gastrointestinal: normoactive bowel sounds, soft, non-tender abdomen


Skin: warm


Musculoskeletal: full muscle strength


Neurologic: AAOx3


Psychiatric: interacting appropriately, not anxious, not encephalopathic, 

thought process linear





Lab Data & Imaging Review





 01/26/18 14:53





 01/26/18 14:53














WBC  8.83 10^3/uL (3.80-9.50)   01/26/18  14:53    


 


RBC  5.49 10^6/uL (4.40-6.38)   01/26/18  14:53    


 


Hgb  16.4 g/dL (13.7-17.5)   01/26/18  14:53    


 


Hct  47.1 % (40.0-51.0)   01/26/18  14:53    


 


MCV  85.8 fL (81.5-99.8)   01/26/18  14:53    


 


MCH  29.9 pg (27.9-34.1)   01/26/18  14:53    


 


MCHC  34.8 g/dL (32.4-36.7)   01/26/18  14:53    


 


RDW  13.2 % (11.5-15.2)   01/26/18  14:53    


 


Plt Count  186 10^3/uL (150-400)   01/26/18  14:53    


 


MPV  9.1 fL (8.7-11.7)   01/26/18  14:53    


 


Neut % (Auto)  73.5 % (39.3-74.2)   01/26/18  14:53    


 


Lymph % (Auto)  11.3 % (15.0-45.0)  L  01/26/18  14:53    


 


Mono % (Auto)  14.2 % (4.5-13.0)  H  01/26/18  14:53    


 


Eos % (Auto)  0.2 % (0.6-7.6)  L  01/26/18  14:53    


 


Baso % (Auto)  0.2 % (0.3-1.7)  L  01/26/18  14:53    


 


Nucleat RBC Rel Count  0.0 % (0.0-0.2)   01/26/18  14:53    


 


Absolute Neuts (auto)  6.49 10^3/uL (1.70-6.50)   01/26/18  14:53    


 


Absolute Lymphs (auto)  1.00 10^3/uL (1.00-3.00)   01/26/18  14:53    


 


Absolute Monos (auto)  1.25 10^3/uL (0.30-0.80)  H  01/26/18  14:53    


 


Absolute Eos (auto)  0.02 10^3/uL (0.03-0.40)  L  01/26/18  14:53    


 


Absolute Basos (auto)  0.02 10^3/uL (0.02-0.10)   01/26/18  14:53    


 


Absolute Nucleated RBC  0.00 10^3/uL (0-0.01)   01/26/18  14:53    


 


Immature Gran %  0.6 % (0.0-1.1)   01/26/18  14:53    


 


Immature Gran #  0.05 10^3/uL (0.00-0.10)   01/26/18  14:53    


 


D-Dimer  5.90 ug/mLFEU (0.00-0.50)  H  01/26/18  14:53    


 


Sodium  139 mEq/L (135-145)   01/26/18  14:53    


 


Potassium  4.3 mEq/L (3.5-5.2)   01/26/18  14:53    


 


Chloride  101 mEq/L ()   01/26/18  14:53    


 


Carbon Dioxide  24 mEq/l (22-31)   01/26/18  14:53    


 


Anion Gap  14 mEq/L (8-16)   01/26/18  14:53    


 


BUN  25 mg/dL (7-23)  H  01/26/18  14:53    


 


Creatinine  1.1 mg/dL (0.7-1.3)   01/26/18  14:53    


 


Estimated GFR  > 60   01/26/18  14:53    


 


Glucose  137 mg/dL ()  H  01/26/18  14:53    


 


Calcium  9.1 mg/dL (8.5-10.4)   01/26/18  14:53    


 


Troponin I  0.073 ng/mL (0.000-0.034)  H  01/26/18  14:53    


 


NT-Pro-B Natriuret Pep  8520 pg/mL (0-125)  H  01/26/18  14:53    











Assessment & Plan


Assessment: 








#Acute bilateral PE with GURDEEP and RUL infarct and right sided strain


#indeterminate troponin likely due to right sided strain. No chest pain


-EKG personally reviewed with no Acute ischemic changes


#RYAN due to bilateral PE


#prolonged QT


#Hx of Afib, s/p ablation





Plan:


Admit


no leg swelling, but will get LE doppler to r/o DVT and assess need for filter 

given right hear strain


Lovenox


TTE


serial trops


Cards consulted by the E.D.


Telemetry


appropriate home meds

## 2018-01-26 NOTE — PDMN
Medical Necessity


Medical necessity: C/M review:  est. > 2 MN LOS for eval and TX of acute 

bilateral pulmonary emboli with right upper lobe and left upper lobe infarct 

and right sided cardiac strain, dyspnea on exertion, prolonged QT requiring 

planned Doppler US of lower extremities, echocardiogram, Cardiology consult, 

ongoing subcutaneous Lovenox, cardiac monitoring, pulse oximetry, supplemental 

O2, comorbid history of atrial fibrillation S/P ablation. per H/P

## 2018-01-26 NOTE — EDPHY
H & P


Time Seen by Provider: 01/26/18 14:39


HPI/ROS: 





CHIEF COMPLAINT:  Shortness of breath





HISTORY OF PRESENT ILLNESS:  72-year-old man usually very active rides a 

bicycle and he is 1st noticed feeling short of breath and mid December when he 

hiked up to the [place] and his liking behind his friends which is not 

typical.


He then noticed symptoms get worse and worse during trip to go skiing and 

finally at the gym on Tuesday he had to cut his workout short.  Over yesterday 

and today he can't even go up 1 flight of stairs without stopping to catch his 

breath.


He was prescribed prednisone Tessalon Perles and Z-Rachid a week ago Monday at 

[facility].  He had a noncontrast chest CT in January 4th of this 

year which did not show reason for dyspnea.


Today symptoms are severe and worse with exertion and not associated with chest 

pain.





REVIEW OF SYSTEMS:


Eye: no change in vision


ENT: no sore throat


Cardiac: no chest pain or syncope


Pulmonary:  HPI


Abdomen: no vomiting, diarrhea, abdominal pain


Musculoskeletal:  No leg swelling


Skin: no rash


Neuro: no headache


Constitutional: no fever


: no urinary symptoms





A comprehensive 10 point review of systems is otherwise negative aside from 

elements mentioned in the history of present illness.





PAST MEDICAL HISTORY:  Total knee arthroplasty, appendectomy, atrial 

fibrillation with ablation in May of 2017





Social history:  Nonsmoker





General Appearance: Alert and conversant, cooperative.


Eyes: No scleral  icterus. 


ENT, Mouth: Normal mucous membranes.


Respiratory:  Patient tachypneic with decreased breath sounds bilaterally but 

no wheezing.


Cardiovascular:  Regular rate and rhythm.


Gastrointestinal:  Abdomen is soft and non tender.


Neurological: Alert, face symmetric, normal motor and sensory in extremities. 


Skin: Warm and dry, no rashes.


Musculoskeletal: No peripheral edema. No calf tenderness.


Psychiatric: Not agitated.





Emergency Department course/MDM:





Differential broad includes but not limited to cardiomyopathy, CHF, pulmonary 

embolism, anemia, acidosis.


Plan for EKG, echo, D-dimer, labs to include CBC chemistry BNP and troponin.





1536:  D-dimer is 5.9, CT angiography discussed and consented.


1618:  CTA personally interpreted as large volume pulmonary embolism, reviewed 

with the patient on the computer system, hospitalist contacted for admission 

and anticoagulation initiated in the emergency department.  Risk benefit 

alternatives discussed the patient and consented.  Lovenox per Karthik.


Smoking Status: Never smoked


Constitutional: 


 Initial Vital Signs











Temperature (C)  36.4 C   01/26/18 14:29


 


Heart Rate  100   01/26/18 14:29


 


Respiratory Rate  18   01/26/18 14:29


 


Blood Pressure  136/94 H  01/26/18 14:29


 


O2 Sat (%)  93   01/26/18 14:29








 











O2 Delivery Mode               Room Air


 


O2 (L/minute)                  2














Allergies/Adverse Reactions: 


 





No Known Allergies Allergy (Verified 01/26/18 16:36)


 








Home Medications: 














 Medication  Instructions  Recorded


 


Ascorbic Acid [Vitamin C 500 mg 500 mg PO DAILY 01/26/18





(*)]  


 


Azithromycin [Zithromax] 250 mg PO DAILY 01/26/18


 


Benzonatate [Tessalon Pearles (RX)] 100 mg PO Q8H 01/26/18


 


Omega-3 Fatty Acids [Fish Oil 1000 1,000 mg PO DAILY 01/26/18





mg (*)]  














Medical Decision Making





- Diagnostics


EKG Interpretation: 





12-lead EKG interpreted by me; official reading is in trace master.  My 

interpretation is sinus rhythm with left axis and multiple nonspecific T-wave 

inversions


Imaging Results: 


 Imaging Impressions





Chest/Thorax CTA  01/26/18 15:35


Impression: 


1. Large volume bilateral pulmonary emboli.


2. Mild right heart strain with flattening of the interventricular septum.


3. Findings suspicious for pulmonary infarction inferior aspect of left upper 

lobe and inferior right upper lobe. Focal pneumonia is felt to be less likely. 


 


Findings discussed with ULISSES MURDOCK  at  16:31 hour, 1/26/2018.


 


 








Large volume pulmonary embolism per Dr. Khoury at 4:32 p.m.


Differential Diagnosis: 





Differential diagnosis considered for shortness of breath including but not 

limited to pulmonary infectious process, COPD, asthma, pulmonary embolus and 

congestive heart failure.


Consult/Admit Bed Type: [facility] for [facility], get echo 1553; [facility] 1636


Critical Care Time: 





Critical care time spent by me, Dr. Murdock, exclusively with the care of this 

patient was 30 minutes, exclusive of PA or NP time and exclusive of separate 

procedures.  The organ system at risk was cardiopulmonary and I ordered 

multiple diagnostics including labs, CT, echo and anticoagulation including 

subcutaneous Lovenox, discussion with hospitalist to stabilize the patient and 

prevent worsening of the patient's condition. 





- Data Points


Laboratory Results: 


 Laboratory Results





 01/26/18 14:53 





 01/26/18 14:53 





 











  01/26/18 01/26/18 01/26/18





  14:53 14:53 14:53


 


WBC      8.83 10^3/uL 10^3/uL





     (3.80-9.50) 


 


RBC      5.49 10^6/uL 10^6/uL





     (4.40-6.38) 


 


Hgb      16.4 g/dL g/dL





     (13.7-17.5) 


 


Hct      47.1 % %





     (40.0-51.0) 


 


MCV      85.8 fL fL





     (81.5-99.8) 


 


MCH      29.9 pg pg





     (27.9-34.1) 


 


MCHC      34.8 g/dL g/dL





     (32.4-36.7) 


 


RDW      13.2 % %





     (11.5-15.2) 


 


Plt Count      186 10^3/uL 10^3/uL





     (150-400) 


 


MPV      9.1 fL fL





     (8.7-11.7) 


 


Neut % (Auto)      73.5 % %





     (39.3-74.2) 


 


Lymph % (Auto)      11.3 % L %





     (15.0-45.0) 


 


Mono % (Auto)      14.2 % H %





     (4.5-13.0) 


 


Eos % (Auto)      0.2 % L %





     (0.6-7.6) 


 


Baso % (Auto)      0.2 % L %





     (0.3-1.7) 


 


Nucleat RBC Rel Count      0.0 % %





     (0.0-0.2) 


 


Absolute Neuts (auto)      6.49 10^3/uL 10^3/uL





     (1.70-6.50) 


 


Absolute Lymphs (auto)      1.00 10^3/uL 10^3/uL





     (1.00-3.00) 


 


Absolute Monos (auto)      1.25 10^3/uL H 10^3/uL





     (0.30-0.80) 


 


Absolute Eos (auto)      0.02 10^3/uL L 10^3/uL





     (0.03-0.40) 


 


Absolute Basos (auto)      0.02 10^3/uL 10^3/uL





     (0.02-0.10) 


 


Absolute Nucleated RBC      0.00 10^3/uL 10^3/uL





     (0-0.01) 


 


Immature Gran %      0.6 % %





     (0.0-1.1) 


 


Immature Gran #      0.05 10^3/uL 10^3/uL





     (0.00-0.10) 


 


D-Dimer    5.90 ug/mLFEU H ug/mLFEU  





    (0.00-0.50)  


 


Sodium  139 mEq/L mEq/L    





   (135-145)   


 


Potassium  4.3 mEq/L mEq/L    





   (3.5-5.2)   


 


Chloride  101 mEq/L mEq/L    





   ()   


 


Carbon Dioxide  24 mEq/l mEq/l    





   (22-31)   


 


Anion Gap  14 mEq/L mEq/L    





   (8-16)   


 


BUN  25 mg/dL H mg/dL    





   (7-23)   


 


Creatinine  1.1 mg/dL mg/dL    





   (0.7-1.3)   


 


Estimated GFR  > 60     





    


 


Glucose  137 mg/dL H mg/dL    





   ()   


 


Calcium  9.1 mg/dL mg/dL    





   (8.5-10.4)   


 


Troponin I  0.073 ng/mL H ng/mL    





   (0.000-0.034)   


 


NT-Pro-B Natriuret Pep  8520 pg/mL H pg/mL    





   (0-125)   











Medications Given: 


 








Discontinued Medications





Enoxaparin Sodium (Lovenox)  80 mg SC EDNOW ONE


   Stop: 01/26/18 16:39


   Last Admin: 01/26/18 18:00 Dose:  80 mg








Departure





- Departure


Disposition: [facility]


Clinical Impression: 


Pulmonary embolism


Qualifiers:


 Pulmonary embolism type: other Chronicity: acute Acute cor pulmonale presence: 

with acute cor pulmonale Qualified Code(s): I26.09 - Other pulmonary embolism 

with acute cor pulmonale





Condition: Fair

## 2018-01-26 NOTE — CPEKG
Heart Rate: 86

RR Interval: 698

P-R Interval: 216

QRSD Interval: 84

QT Interval: 420

QTC Interval: 503

P Axis: -24

QRS Axis: -34

T Wave Axis: 144

EKG Severity - ABNORMAL ECG -

EKG Impression: SINUS RHYTHM

EKG Impression: LEFT AXIS DEVIATION

EKG Impression: ABNORMAL T, PROBABLE ISCHEMIA, ANT-LAT LEADS

EKG Impression: PROLONGED QT INTERVAL

Electronically Signed By: Alvin Wilkins 26-Jan-2018 15:07:46

## 2018-01-27 LAB — PLATELET # BLD: 172 10^3/UL (ref 150–400)

## 2018-01-27 RX ADMIN — ENOXAPARIN SODIUM SCH MG: 100 INJECTION SUBCUTANEOUS at 08:56

## 2018-01-27 RX ADMIN — ENOXAPARIN SODIUM SCH MG: 100 INJECTION SUBCUTANEOUS at 19:54

## 2018-01-27 RX ADMIN — ENOXAPARIN SODIUM SCH MG: 100 INJECTION SUBCUTANEOUS at 09:02

## 2018-01-27 NOTE — ASMTCMCOM
CM Note

 

CM Note                       

Notes:

Pt will likely be ready for DC tomorrow. Anticipate he will have no DC needs.

 

Date Signed:  01/27/2018 02:58 PM

Electronically Signed By:Pearl Freeman LCSW

## 2018-01-27 NOTE — HOSPPROG
Hospitalist Progress Note


Assessment/Plan: 


71 yo male admitted with PE, large clot load to left and right lung, echo 

showing right heart failure with elevated troponin, severe PHTN; no hypotension

, rhytm disturbance.  h/o atrial fib and s/p ablation 5/2017.  h/o right knee 

replacement with 3 weeks lovenox 40mg qday.  patient new to me today





-PE: on lovenox.  Will transition to Eliquis tomorrow.  





-atrial fib by history; NSR now, stable





-right knee replacement post:  doing well.  No homans sign, minor swelling 

without pain





-dispo:  possible discharge tomorrow on eliquis.  





patient requires > 2 midnight stay to monitor rhythm, BP, Sao2 due to large 

clot load. 





discussed with cardiology; CT scan reviewed by myself


Time:  45 minutes 





Subjective: reports feeling well.  feels some chest pain with deep breath, no 

hemoptysis


Objective: 


 Vital Signs











Temp Pulse Resp BP Pulse Ox


 


 36.8 C   65   20   102/67   94 


 


 01/27/18 07:29  01/27/18 07:29  01/27/18 07:29  01/27/18 07:29  01/27/18 07:29








 Laboratory Results





 01/27/18 03:41 





 01/27/18 03:41 





 











 01/26/18 01/27/18 01/28/18





 05:59 05:59 05:59


 


Intake Total  400 


 


Output Total  250 


 


Balance  150 














- Time Spent With Patient


Time Spent with Patient: greater than 35 minutes


Time Spent with Patient: Greater than 35 minutes spent on this patients care, 

greater than 50% of time spent counseling, educating, and coordinating care 

regarding the above mentioned plan.





- Pending Discharge


Pending Discharge Within 24 Hours: Yes


Pending Discharge Date: 01/28/18


Pending Discharge Time: 11:00





- Physical Exam


Constitutional: no apparent distress


Eyes: PERRL, anicteric sclera


Ears, Nose, Mouth, Throat: moist mucous membranes, hearing normal


Cardiovascular: regular rate and rhythym, no murmur, rub, or gallop


Respiratory: no respiratory distress, reduced air movement, dullness to 

percussion


Gastrointestinal: normoactive bowel sounds


Genitourinary: no bladder fullness, no bladder tenderness


Musculoskeletal: full muscle strength, other (trace edema on right leg)


Neurologic: AAOx3


Psychiatric: interacting appropriately


Lymph, Heme, Immunologic: no cervical LAD





ICD10 Worksheet


Patient Problems: 


 Problems











Problem Status Onset


 


Pulmonary embolism Acute  


 


Atrial fibrillation or flutter Acute  


 


Atrial tachycardia Acute

## 2018-01-28 VITALS
SYSTOLIC BLOOD PRESSURE: 96 MMHG | DIASTOLIC BLOOD PRESSURE: 64 MMHG | TEMPERATURE: 97.5 F | HEART RATE: 60 BPM | OXYGEN SATURATION: 93 %

## 2018-01-28 VITALS — RESPIRATION RATE: 16 BRPM

## 2018-01-28 LAB — PLATELET # BLD: 171 10^3/UL (ref 150–400)

## 2018-01-28 RX ADMIN — ENOXAPARIN SODIUM SCH MG: 100 INJECTION SUBCUTANEOUS at 09:17

## 2018-01-28 NOTE — ASDISCHSUM
----------------------------------------------

Discharge Information

----------------------------------------------

Plan Status:                                         Medically Cleared to Leave:

Discharge Date:01/28/2018 02:53 PM                   CM D/C Disposition:

ADT D/C Disposition:Home, Routine, Self-Care         Projected Discharge Date:01/28/2018 02:53 PM

Transportation at D/C:                               Discharge Delay Reason:

Follow-Up Date:01/28/2018 02:53 PM                   Discharge Slot:

Final Diagnosis:

----------------------------------------------

Placement Information

----------------------------------------------

----------------------------------------------

Patient Contact Information

----------------------------------------------

Contact Name:DIXON                            Relationship:Caio

Address:                                             Home [phone]

                                                     Work Phone:

City:                                                [facility] Phone:

State/Zip Code:                                      Email:

----------------------------------------------

Financial Information

----------------------------------------------

Financial Class:

Primary Plan Desc:MEDICARE INPATIENT                 Primary Plan Number:[id number]

Secondary Plan Desc:[employer]                   Secondary Plan Number:[id number]

 

 

----------------------------------------------

Assessment Information

----------------------------------------------

----------------------------------------------

BC CM Progress Note

----------------------------------------------

CM Note

 

CM Note                       

Notes:

Pt will likely be ready for DC tomorrow. Anticipate he will have no DC needs.

 

Date Signed:  01/27/2018 02:58 PM

Electronically Signed By:Pearl Freeman LCSW

 

 

----------------------------------------------

Intervention Information

----------------------------------------------

## 2018-10-10 ENCOUNTER — HOSPITAL ENCOUNTER (OUTPATIENT)
Dept: HOSPITAL 80 - FIMAGING | Age: 73
End: 2018-10-10
Attending: INTERNAL MEDICINE
Payer: COMMERCIAL

## 2018-10-10 DIAGNOSIS — I82.4Z2: Primary | ICD-10-CM

## 2019-02-20 ENCOUNTER — HOSPITAL ENCOUNTER (OUTPATIENT)
Dept: HOSPITAL 80 - FIMAGING | Age: 74
End: 2019-02-20
Attending: PSYCHIATRY & NEUROLOGY
Payer: COMMERCIAL

## 2019-02-20 DIAGNOSIS — R48.1: Primary | ICD-10-CM

## 2019-02-20 PROCEDURE — 70553 MRI BRAIN STEM W/O & W/DYE: CPT

## 2019-02-20 PROCEDURE — A9585 GADOBUTROL INJECTION: HCPCS

## 2019-04-10 ENCOUNTER — HOSPITAL ENCOUNTER (OUTPATIENT)
Dept: HOSPITAL 80 - BHFA | Age: 74
End: 2019-04-10
Attending: INTERNAL MEDICINE
Payer: COMMERCIAL

## 2019-04-10 DIAGNOSIS — I63.033: Primary | ICD-10-CM
